# Patient Record
Sex: MALE | Race: WHITE | NOT HISPANIC OR LATINO | URBAN - METROPOLITAN AREA
[De-identification: names, ages, dates, MRNs, and addresses within clinical notes are randomized per-mention and may not be internally consistent; named-entity substitution may affect disease eponyms.]

---

## 2017-06-06 ENCOUNTER — HOSPITAL ENCOUNTER (OUTPATIENT)
Dept: CARDIOLOGY | Facility: HOSPITAL | Age: 47
Discharge: HOME | End: 2017-06-06
Attending: INTERNAL MEDICINE | Admitting: INTERNAL MEDICINE

## 2017-08-26 ENCOUNTER — HOSPITAL ENCOUNTER (OUTPATIENT)
Dept: URGENT CARE | Facility: CLINIC | Age: 47
Discharge: HOME | End: 2017-08-26
Attending: EMERGENCY MEDICINE | Admitting: EMERGENCY MEDICINE

## 2023-07-28 ENCOUNTER — TELEPHONE (OUTPATIENT)
Dept: NEUROSURGERY | Facility: CLINIC | Age: 53
End: 2023-07-28

## 2023-07-28 NOTE — TELEPHONE ENCOUNTER
PATIENT: RK CARTER    PROVIDER: DR. CABRAL    PATIENT CALLED STATING THAT HE HAS BEEN WAKING UP IN THE MIDDLE OF THE NIGHT STRETCHING HIS NECK BEFORE HE CATCHES HIMSELF. HE SAYS THAT THERE IS NO PAIN WHEN HE DOES IT, BUT IS CONCERNED THAT IT MIGHT BE CAUSING PROBLEMS FOR THE HEALING PROCESS. HE WANTS TO KNOW IF IT IS OK TO STRETCH OR SHOULD HE AVOID IT.    HE ALSO STATES THAT HE HAS A VERY FAINT TINGLING/NUMBNESS IN HIS FINGERS, BUT HIS HAND FUNCTIONING JUST FINE AND IS STRONG.    PLEASE ADVISE THE PATIENT ON THESE ASAP. THANKS.    PATIENT CALL BACK # [phone]    CALL BACK ANYTIME

## 2023-08-02 ENCOUNTER — TELEPHONE (OUTPATIENT)
Dept: NEUROSURGERY | Facility: CLINIC | Age: 53
End: 2023-08-02
Payer: COMMERCIAL

## 2023-08-03 ENCOUNTER — HOSPITAL ENCOUNTER (OUTPATIENT)
Dept: GENERAL RADIOLOGY | Facility: HOSPITAL | Age: 53
Discharge: HOME | End: 2023-08-03
Admitting: NEUROLOGICAL SURGERY
Payer: COMMERCIAL

## 2023-08-03 ENCOUNTER — OFFICE VISIT (OUTPATIENT)
Dept: NEUROSURGERY | Facility: CLINIC | Age: 53
End: 2023-08-03
Payer: COMMERCIAL

## 2023-08-03 VITALS
BODY MASS INDEX: 18.87 KG/M2 | HEIGHT: 69 IN | WEIGHT: 127.4 LBS | HEART RATE: 111 BPM | SYSTOLIC BLOOD PRESSURE: 123 MMHG | DIASTOLIC BLOOD PRESSURE: 89 MMHG | RESPIRATION RATE: 18 BRPM

## 2023-08-03 DIAGNOSIS — S12.130A CLOSED HANGMAN'S FRACTURE, INITIAL ENCOUNTER: ICD-10-CM

## 2023-08-03 DIAGNOSIS — S12.130A CLOSED HANGMAN'S FRACTURE, INITIAL ENCOUNTER: Primary | ICD-10-CM

## 2023-08-03 PROCEDURE — 72050 X-RAY EXAM NECK SPINE 4/5VWS: CPT

## 2023-08-24 ENCOUNTER — OFFICE VISIT (OUTPATIENT)
Dept: NEUROSURGERY | Facility: CLINIC | Age: 53
End: 2023-08-24
Payer: COMMERCIAL

## 2023-08-24 ENCOUNTER — HOSPITAL ENCOUNTER (OUTPATIENT)
Dept: GENERAL RADIOLOGY | Facility: HOSPITAL | Age: 53
Discharge: HOME | End: 2023-08-24
Admitting: NEUROLOGICAL SURGERY
Payer: COMMERCIAL

## 2023-08-24 VITALS
BODY MASS INDEX: 19.55 KG/M2 | DIASTOLIC BLOOD PRESSURE: 95 MMHG | HEIGHT: 69 IN | SYSTOLIC BLOOD PRESSURE: 147 MMHG | WEIGHT: 132 LBS | HEART RATE: 102 BPM

## 2023-08-24 DIAGNOSIS — S12.130A CLOSED HANGMAN'S FRACTURE, INITIAL ENCOUNTER: Primary | ICD-10-CM

## 2023-08-24 DIAGNOSIS — S12.130A CLOSED HANGMAN'S FRACTURE, INITIAL ENCOUNTER: ICD-10-CM

## 2023-08-24 PROCEDURE — 72050 X-RAY EXAM NECK SPINE 4/5VWS: CPT
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
## 2023-09-05 ENCOUNTER — TELEPHONE (OUTPATIENT)
Dept: NEUROSURGERY | Facility: CLINIC | Age: 53
End: 2023-09-05

## 2023-09-05 ENCOUNTER — PATIENT MESSAGE (OUTPATIENT)
Dept: NEUROSURGERY | Facility: CLINIC | Age: 53
End: 2023-09-05

## 2023-09-05 NOTE — TELEPHONE ENCOUNTER
Patient called the office and stated he returns to work tomorrow and that he is needing a return to work note with more detail and he sent us a paper on [employer] that his work would like to be filled out.

## 2023-10-03 ENCOUNTER — HOSPITAL ENCOUNTER (OUTPATIENT)
Dept: GENERAL RADIOLOGY | Facility: HOSPITAL | Age: 53
Discharge: HOME | End: 2023-10-03
Admitting: NEUROLOGICAL SURGERY
Payer: COMMERCIAL

## 2023-10-03 ENCOUNTER — OFFICE VISIT (OUTPATIENT)
Dept: NEUROSURGERY | Facility: CLINIC | Age: 53
End: 2023-10-03
Payer: COMMERCIAL

## 2023-10-03 VITALS
SYSTOLIC BLOOD PRESSURE: 146 MMHG | DIASTOLIC BLOOD PRESSURE: 98 MMHG | HEART RATE: 82 BPM | HEIGHT: 69 IN | WEIGHT: 135.2 LBS | BODY MASS INDEX: 20.03 KG/M2

## 2023-10-03 DIAGNOSIS — S12.130A CLOSED HANGMAN'S FRACTURE, INITIAL ENCOUNTER: Primary | ICD-10-CM

## 2023-10-03 DIAGNOSIS — S12.130A CLOSED HANGMAN'S FRACTURE, INITIAL ENCOUNTER: ICD-10-CM

## 2023-10-03 PROCEDURE — 72050 X-RAY EXAM NECK SPINE 4/5VWS: CPT

## 2023-10-03 PROCEDURE — 99214 OFFICE O/P EST MOD 30 MIN: CPT | Performed by: NEUROLOGICAL SURGERY
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
## 2023-10-11 ENCOUNTER — TREATMENT (OUTPATIENT)
Dept: PHYSICAL THERAPY | Facility: CLINIC | Age: 53
End: 2023-10-11
Payer: COMMERCIAL

## 2023-10-11 DIAGNOSIS — S12.130A CLOSED HANGMAN'S FRACTURE, INITIAL ENCOUNTER: Primary | ICD-10-CM

## 2023-10-11 DIAGNOSIS — M54.2 PAIN, NECK: ICD-10-CM
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
## 2023-10-17 ENCOUNTER — TREATMENT (OUTPATIENT)
Dept: PHYSICAL THERAPY | Facility: CLINIC | Age: 53
End: 2023-10-17
Payer: COMMERCIAL

## 2023-10-17 DIAGNOSIS — S12.130A CLOSED HANGMAN'S FRACTURE, INITIAL ENCOUNTER: Primary | ICD-10-CM

## 2023-10-17 DIAGNOSIS — M54.2 PAIN, NECK: ICD-10-CM

## 2023-10-17 NOTE — PROGRESS NOTES
Physical Therapy Daily Progress Note      Patient: Zackery Madden   : 1970  Diagnosis/ICD-10 Code:  Closed hangman's fracture, initial encounter [S12.130A]  Referring practitioner: Gabe Swanson MD  Date of Initial Visit: Type: THERAPY  Noted: 10/11/2023  Today's Date: 10/17/2023  Patient seen for 2 sessions             Subjective No change in symptoms    Objective   See Exercise, Manual, and Modality Logs for complete treatment.       Assessment/Plan  Pt seemed to respond well to manual techniques and t-band rows today    Progress per Plan of Care           Timed:         Manual Therapy:    23     mins  90541;     Therapeutic Exercise:    5     mins  56922;         Timed Treatment:   28   mins   Total Treatment:     28   mins        Ismael Cohen PTA  Physical Therapist Assistant

## 2023-10-24 ENCOUNTER — TREATMENT (OUTPATIENT)
Dept: PHYSICAL THERAPY | Facility: CLINIC | Age: 53
End: 2023-10-24
Payer: COMMERCIAL

## 2023-10-24 DIAGNOSIS — M54.2 PAIN, NECK: ICD-10-CM

## 2023-10-24 DIAGNOSIS — S12.130A CLOSED HANGMAN'S FRACTURE, INITIAL ENCOUNTER: Primary | ICD-10-CM

## 2023-10-24 PROCEDURE — 97110 THERAPEUTIC EXERCISES: CPT | Performed by: PHYSICAL THERAPIST

## 2023-10-24 PROCEDURE — 97140 MANUAL THERAPY 1/> REGIONS: CPT | Performed by: PHYSICAL THERAPIST

## 2023-10-24 NOTE — PROGRESS NOTES
Physical Therapy Daily Progress Note      Patient: Zackery Madden   : 1970  Diagnosis/ICD-10 Code:  Closed hangman's fracture, initial encounter [S12.130A]  Referring practitioner: Gabe Swanson MD  Date of Initial Visit: Type: THERAPY  Noted: 10/11/2023  Today's Date: 10/24/2023  Patient seen for 3 sessions             Subjective Sore mainly in the left side of his neck.    Objective   See Exercise, Manual, and Modality Logs for complete treatment.       Assessment/Plan  Tolerated manual techniques and progression/addition of exercises    Progress per Plan of Care           Timed:         Manual Therapy:    25     mins  67652;     Therapeutic Exercise:    10     mins  80434;         Timed Treatment:   35   mins   Total Treatment:     35   mins        Ismael Cohen PTA  Physical Therapist Assistant
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
## 2023-12-07 ENCOUNTER — TELEPHONE (OUTPATIENT)
Dept: NEUROSURGERY | Facility: CLINIC | Age: 53
End: 2023-12-07
Payer: COMMERCIAL

## 2023-12-07 PROBLEM — F10.929 ALCOHOL INTOXICATION: Status: ACTIVE | Noted: 2023-06-18

## 2023-12-07 PROBLEM — K62.5 RECTAL HEMORRHAGE: Status: ACTIVE | Noted: 2023-12-07

## 2023-12-07 PROBLEM — I51.9 LEFT VENTRICULAR SYSTOLIC DYSFUNCTION: Status: ACTIVE | Noted: 2017-06-05

## 2023-12-07 PROBLEM — R07.89 ANTERIOR CHEST WALL PAIN: Status: ACTIVE | Noted: 2017-08-26

## 2023-12-07 PROBLEM — R00.0 TACHYCARDIA: Status: ACTIVE | Noted: 2017-06-05

## 2023-12-07 PROBLEM — Z72.0 TOBACCO USER: Status: ACTIVE | Noted: 2023-12-07

## 2023-12-07 PROBLEM — K64.8 BLEEDING INTERNAL HEMORRHOIDS: Status: ACTIVE | Noted: 2023-12-07

## 2023-12-07 PROBLEM — I51.89 LEFT VENTRICULAR SYSTOLIC DYSFUNCTION: Status: ACTIVE | Noted: 2017-06-05

## 2023-12-07 PROBLEM — R07.9 CHEST PAIN: Status: ACTIVE | Noted: 2017-06-05

## 2023-12-07 PROBLEM — R53.83 FATIGUE: Status: ACTIVE | Noted: 2023-12-07

## 2023-12-07 PROBLEM — S12.100A C2 CERVICAL FRACTURE: Status: ACTIVE | Noted: 2023-06-18

## 2023-12-07 PROBLEM — F17.200 NICOTINE DEPENDENCE: Status: ACTIVE | Noted: 2020-01-01

## 2023-12-07 NOTE — TELEPHONE ENCOUNTER
Called the patient about his PT as he only completed 3 PT sessions. He said they were not doing anything that he is not already doing at home so he is doing HEP. I told him we will see him tomorrow.

## 2023-12-08 ENCOUNTER — OFFICE VISIT (OUTPATIENT)
Dept: NEUROSURGERY | Facility: CLINIC | Age: 53
End: 2023-12-08
Payer: COMMERCIAL

## 2023-12-08 VITALS
HEIGHT: 69 IN | SYSTOLIC BLOOD PRESSURE: 138 MMHG | WEIGHT: 141.6 LBS | DIASTOLIC BLOOD PRESSURE: 84 MMHG | HEART RATE: 99 BPM | BODY MASS INDEX: 20.97 KG/M2

## 2023-12-08 DIAGNOSIS — S12.130A CLOSED HANGMAN'S FRACTURE, INITIAL ENCOUNTER: Primary | ICD-10-CM

## 2024-03-27 ENCOUNTER — TELEPHONE (OUTPATIENT)
Dept: NEUROSURGERY | Facility: CLINIC | Age: 54
End: 2024-03-27

## 2024-03-27 NOTE — TELEPHONE ENCOUNTER
CALLED PATIENT REGARDING IMAGING DISC FROM PRIOR APPOINTMENT. NO ANSWER, UNABLE TO LEAVE VOICE MAIL.  RELAY:  WOULD HE LIKE US TO MAIL DISC TO HIM?
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
## 2025-07-22 ENCOUNTER — OFFICE VISIT (OUTPATIENT)
Dept: NEUROSURGERY | Facility: CLINIC | Age: 55
End: 2025-07-22
Payer: COMMERCIAL

## 2025-07-22 VITALS
OXYGEN SATURATION: 96 % | HEIGHT: 69 IN | SYSTOLIC BLOOD PRESSURE: 141 MMHG | HEART RATE: 93 BPM | DIASTOLIC BLOOD PRESSURE: 99 MMHG | WEIGHT: 153 LBS | BODY MASS INDEX: 22.66 KG/M2

## 2025-07-22 DIAGNOSIS — M54.2 NECK PAIN: Primary | ICD-10-CM

## 2025-07-22 DIAGNOSIS — S12.101D CLOSED NONDISPLACED FRACTURE OF SECOND CERVICAL VERTEBRA WITH ROUTINE HEALING, UNSPECIFIED FRACTURE MORPHOLOGY, SUBSEQUENT ENCOUNTER: ICD-10-CM

## 2025-07-22 RX ORDER — METHOCARBAMOL 500 MG/1
500 TABLET, FILM COATED ORAL NIGHTLY PRN
Qty: 30 TABLET | Refills: 0 | Status: SHIPPED | OUTPATIENT
Start: 2025-07-22

## 2025-07-25 ENCOUNTER — TELEPHONE (OUTPATIENT)
Dept: NEUROSURGERY | Facility: CLINIC | Age: 55
End: 2025-07-25
Payer: COMMERCIAL

## 2025-07-25 NOTE — TELEPHONE ENCOUNTER
Caller: NICK STEIN    Relationship: SISTER    Best call back number: [phone]    What orders are you requesting (i.e. lab or imaging): CT CERVICAL WO    In what timeframe would the patient need to come in: ASAP    Where will you receive your lab/imaging services: [facility]     Additional notes: PATIENTS SISTER CALLED AND THEY ARE ASKING IF PATIENTS CT ORDER CAN BE SENT TO [facility] SCAN IS NOT UNTIL 08/12/25 AT -SENDING TO OFFICE TO ADVISE THANK YOU

## 2025-07-28 NOTE — TELEPHONE ENCOUNTER
SUBMITTED FOR AUTH VIA [facility], STATUS PENDING REVIEW. WILL UPDATE WHEN AVAILABLE. SPOKE WITH PATIENT SISTER, SHE VERBALIZED UNDERSTANDING.